# Patient Record
(demographics unavailable — no encounter records)

---

## 2025-05-21 NOTE — ASSESSMENT
[FreeTextEntry1] : Patient seem to have PCOS based upon the clinical signs and symptoms. Explained to patient that PCOS is a clinical diagnosis, if she meets 2 out of 3 [place] criteria she has a diagnosis of PCOS, regardless if her testosterone and other hormone levels are within normal level limit Counseled regarding the PCOS including complications of diabetes, hypercholesterolemia, insulin resistance, obesity, subfertility/infertility, irregular menses. Discussed that the treatment of PCOS is weight loss, exercise, metformin and OCPs. Check LH, FSH, estradiol, total testosterone, androstenedione and DHEA-S on day 3 of her menstruation cycle. Check 17 hydroxyprogesterone to rule out nonclassic congenital adrenal hyperplasia Check insulin level and A1c  check total and free testosterone, DHEA-S, LH, FSH, PRL, TSH - Check AM cortisol  Patient has enlarged thyroid gland on palpation. Therefore, will do US thyroid

## 2025-05-21 NOTE — PHYSICAL EXAM
[Alert] : alert [No Acute Distress] : no acute distress [Normal Sclera/Conjunctiva] : normal sclera/conjunctiva [Normal Oropharynx] : the oropharynx was normal [No LAD] : no lymphadenopathy [Supple] : the neck was supple [No Thyroid Nodules] : no palpable thyroid nodules [No Edema] : no peripheral edema [Pedal Pulses Normal] : the pedal pulses are present [Normal Bowel Sounds] : normal bowel sounds [Not Tender] : non-tender [Not Distended] : not distended [Soft] : abdomen soft [Normal Anterior Cervical Nodes] : no anterior cervical lymphadenopathy [Normal Posterior Cervical Nodes] : no posterior cervical lymphadenopathy [No Spinal Tenderness] : no spinal tenderness [Spine Straight] : spine straight [No Stigmata of Cushings Syndrome] : no stigmata of Cushings Syndrome [Normal Gait] : normal gait [Normal Strength/Tone] : muscle strength and tone were normal [No Rash] : no rash [Abdominal Striae] : abdominal striae present [Acne] : acne present [Hirsutism] : hirsutism present [No Sensory Deficits] : the sensory exam was normal to light touch and pinprick [No Tremors] : no tremors [Oriented x3] : oriented to person, place, and time [Acanthosis Nigricans] : no acanthosis nigricans [de-identified] : overweight [de-identified] : Thyroid gland is mildly enlarged

## 2025-05-21 NOTE — HISTORY OF PRESENT ILLNESS
[FreeTextEntry1] : 28 year old Female with PMH of elevated liver enzymes came for evaluation of irregular menses and hormonal evaluation  PCOS: Menses: Reports her menstural cycle has been irregular for last 5 year. It comes every month but dates vary a lot and the flow has reduced.  LMP was in the end of April and menses also come in first week of April. She has not mensturated in May 2025 Menarche: at the age of12  Sexually active: Not active Fertility planning: No plans currently.  Acne: Yes, it has been occurring for last 4 weeks, but its resolving Hirsutism: Yes  Ovarian ultrasound: Never had an ovarian ultrasound No prior adrenal imaging.  Denies virilization symptoms including deepening of the voice, temporal/crown balding, increased muscle mass, and clitoromegaly. Denies galactorrhea.  Denies OTC medication, supplements, anabolic steroids  She was taking vitamin D 3, manganese however she stopped taking as she was told she had elevated liver enzymes.

## 2025-05-21 NOTE — PHYSICAL EXAM
[Alert] : alert [No Acute Distress] : no acute distress [Normal Sclera/Conjunctiva] : normal sclera/conjunctiva [Normal Oropharynx] : the oropharynx was normal [No LAD] : no lymphadenopathy [Supple] : the neck was supple [No Thyroid Nodules] : no palpable thyroid nodules [No Edema] : no peripheral edema [Pedal Pulses Normal] : the pedal pulses are present [Normal Bowel Sounds] : normal bowel sounds [Not Tender] : non-tender [Not Distended] : not distended [Soft] : abdomen soft [Normal Anterior Cervical Nodes] : no anterior cervical lymphadenopathy [Normal Posterior Cervical Nodes] : no posterior cervical lymphadenopathy [No Spinal Tenderness] : no spinal tenderness [Spine Straight] : spine straight [No Stigmata of Cushings Syndrome] : no stigmata of Cushings Syndrome [Normal Gait] : normal gait [Normal Strength/Tone] : muscle strength and tone were normal [No Rash] : no rash [Abdominal Striae] : abdominal striae present [Acne] : acne present [Hirsutism] : hirsutism present [No Sensory Deficits] : the sensory exam was normal to light touch and pinprick [No Tremors] : no tremors [Oriented x3] : oriented to person, place, and time [Acanthosis Nigricans] : no acanthosis nigricans [de-identified] : overweight [de-identified] : Thyroid gland is mildly enlarged